# Patient Record
Sex: MALE | Race: BLACK OR AFRICAN AMERICAN | NOT HISPANIC OR LATINO | Employment: FULL TIME | URBAN - METROPOLITAN AREA
[De-identification: names, ages, dates, MRNs, and addresses within clinical notes are randomized per-mention and may not be internally consistent; named-entity substitution may affect disease eponyms.]

---

## 2020-08-10 ENCOUNTER — OFFICE VISIT (OUTPATIENT)
Dept: FAMILY MEDICINE | Facility: CLINIC | Age: 30
End: 2020-08-10
Payer: COMMERCIAL

## 2020-08-10 VITALS
HEART RATE: 82 BPM | DIASTOLIC BLOOD PRESSURE: 76 MMHG | WEIGHT: 203.31 LBS | OXYGEN SATURATION: 96 % | SYSTOLIC BLOOD PRESSURE: 134 MMHG | RESPIRATION RATE: 16 BRPM | TEMPERATURE: 99 F

## 2020-08-10 DIAGNOSIS — Z20.822 EXPOSURE TO COVID-19 VIRUS: Primary | ICD-10-CM

## 2020-08-10 DIAGNOSIS — Z00.00 HEALTHCARE MAINTENANCE: ICD-10-CM

## 2020-08-10 PROCEDURE — 99999 PR PBB SHADOW E&M-EST. PATIENT-LVL IV: CPT | Mod: PBBFAC,CS,, | Performed by: INTERNAL MEDICINE

## 2020-08-10 PROCEDURE — 99204 OFFICE O/P NEW MOD 45 MIN: CPT | Mod: S$GLB,CS,, | Performed by: INTERNAL MEDICINE

## 2020-08-10 PROCEDURE — 99204 PR OFFICE/OUTPT VISIT, NEW, LEVL IV, 45-59 MIN: ICD-10-PCS | Mod: S$GLB,CS,, | Performed by: INTERNAL MEDICINE

## 2020-08-10 PROCEDURE — 99999 PR PBB SHADOW E&M-EST. PATIENT-LVL IV: ICD-10-PCS | Mod: PBBFAC,CS,, | Performed by: INTERNAL MEDICINE

## 2020-08-10 RX ORDER — CYANOCOBALAMIN (VITAMIN B-12) 500 MCG
TABLET ORAL
COMMUNITY
Start: 2020-08-08

## 2020-08-10 NOTE — PROGRESS NOTES
HISTORY OF PRESENT ILLNESS:  Carlos Enrique Lozoya is a 30 y.o. male who presents to the clinic today for Establish Care (head feels heavy currently) and Insomnia    Seen with anastacia today.    Headache  Since coronavirus started.  Was working at [employer] and just went back to work.  Like a heaviness in the head  Feels like it the whole head and eye pain on the right side.    Reports he feels like he has pimples in his mouth - inside the bottom lip.    He reports he does not feel he can work full time b/c of the headache.    PAST MEDICAL HISTORY:  History reviewed. No pertinent past medical history.    PAST SURGICAL HISTORY:  No past surgical history on file.    SOCIAL HISTORY:  Social History     Socioeconomic History    Marital status: Significant Other     Spouse name: Not on file    Number of children: Not on file    Years of education: Not on file    Highest education level: Not on file   Occupational History    Not on file   Social Needs    Financial resource strain: Not hard at all    Food insecurity     Worry: Never true     Inability: Never true    Transportation needs     Medical: No     Non-medical: No   Tobacco Use    Smoking status: Never Smoker   Substance and Sexual Activity    Alcohol use: Yes     Frequency: Monthly or less     Drinks per session: 1 or 2     Binge frequency: Never    Drug use: Not on file    Sexual activity: Not on file   Lifestyle    Physical activity     Days per week: 5 days     Minutes per session: 30 min    Stress: Very much   Relationships    Social connections     Talks on phone: Three times a week     Gets together: Twice a week     Attends [employer] service: Not on file     Active member of club or organization: Yes     Attends meetings of clubs or organizations: More than 4 times per year     Relationship status: Never    Other Topics Concern    Not on file   Social History Narrative    Not on file       FAMILY HISTORY:  Family History   Problem Relation Age  of Onset    Diabetes Mother     Hypertension Brother     Diabetes Maternal Grandmother        ALLERGIES AND MEDICATIONS: updated and reviewed.  Review of patient's allergies indicates:  No Known Allergies         CARE TEAM:  Patient Care Team:  Kelechi Tucker MD as PCP - General (Internal Medicine)         REVIEW OF SYSTEMS:  Review of Systems   Constitutional: Positive for fatigue. Negative for activity change and unexpected weight change.   HENT: Negative for hearing loss, rhinorrhea and trouble swallowing.    Eyes: Positive for pain (right). Negative for photophobia, discharge and visual disturbance.   Respiratory: Negative for cough, chest tightness, shortness of breath and wheezing.    Cardiovascular: Negative for chest pain and palpitations.   Gastrointestinal: Negative for blood in stool, constipation, diarrhea and vomiting.   Endocrine: Positive for polydipsia and polyuria.   Genitourinary: Negative for difficulty urinating, hematuria and urgency.   Musculoskeletal: Negative for arthralgias, joint swelling and neck pain.   Neurological: Positive for headaches. Negative for dizziness, syncope, weakness and light-headedness.   Psychiatric/Behavioral: Positive for dysphoric mood. Negative for confusion.         PHYSICAL EXAM:  Vitals:    08/10/20 1109   BP: 134/76   Pulse: 82   Resp: 16   Temp: 98.7 °F (37.1 °C)             Weight 203 lb    Physical Examination: General appearance - alert, well appearing, and in no distress and oriented to person, place, and time  Mental status - normal mood, behavior, speech, dress, motor activity, and thought processes  Eyes - sclera anicteric, left eye normal, right eye normal  Chest - clear to auscultation, no wheezes, rales or rhonchi, symmetric air entry  Heart - normal rate and regular rhythm, no murmurs noted  Neurological - alert, oriented, normal speech, no focal findings or movement disorder noted  Extremities - peripheral pulses normal, no pedal edema, no  clubbing or cyanosis       ASSESSMENT AND PLAN:  Exposure to Covid-19 Virus  -     COVID-19 (SARS CoV-2) IgG Antibody; Future; Expected date: 08/10/2020    Healthcare maintenance  -     Hemoglobin A1C; Future; Expected date: 08/10/2020  -     Comprehensive metabolic panel; Future; Expected date: 08/10/2020  -     Lipid Panel; Future; Expected date: 08/10/2020  -     CBC auto differential; Future; Expected date: 08/10/2020  -     TSH; Future; Expected date: 08/10/2020  -     HIV 1/2 Ag/Ab (4th Gen); Future; Expected date: 08/10/2020  -     Hepatitis C Antibody; Future; Expected date: 08/10/2020  -     Ambulatory referral/consult to Optometry; Future; Expected date: 08/17/2020       Advised for PRN NSAIDs or Tylenol for headache symptoms.  Call if symptoms increase or change in nature.      Follow up 3 months or sooner as needed.

## 2020-08-20 DIAGNOSIS — R73.03 PREDIABETES: Primary | ICD-10-CM

## 2020-08-21 ENCOUNTER — TELEPHONE (OUTPATIENT)
Dept: FAMILY MEDICINE | Facility: CLINIC | Age: 30
End: 2020-08-21

## 2020-08-21 NOTE — TELEPHONE ENCOUNTER
----- Message from Kelechi Tucker MD sent at 8/20/2020  1:33 PM CDT -----  Please call the patient to schedule non fasting lab in 3 months.    Message sent on portal

## 2021-10-04 ENCOUNTER — PATIENT MESSAGE (OUTPATIENT)
Dept: ADMINISTRATIVE | Facility: HOSPITAL | Age: 31
End: 2021-10-04

## 2022-05-31 ENCOUNTER — PATIENT MESSAGE (OUTPATIENT)
Dept: ADMINISTRATIVE | Facility: HOSPITAL | Age: 32
End: 2022-05-31
Payer: COMMERCIAL

## 2023-05-19 ENCOUNTER — PATIENT OUTREACH (OUTPATIENT)
Dept: ADMINISTRATIVE | Facility: HOSPITAL | Age: 33
End: 2023-05-19
Payer: COMMERCIAL